# Patient Record
Sex: FEMALE | Race: WHITE | NOT HISPANIC OR LATINO | URBAN - METROPOLITAN AREA
[De-identification: names, ages, dates, MRNs, and addresses within clinical notes are randomized per-mention and may not be internally consistent; named-entity substitution may affect disease eponyms.]

---

## 2021-08-24 ENCOUNTER — EMERGENCY (EMERGENCY)
Facility: HOSPITAL | Age: 46
LOS: 0 days | Discharge: HOME | End: 2021-08-24
Attending: EMERGENCY MEDICINE | Admitting: EMERGENCY MEDICINE
Payer: MEDICAID

## 2021-08-24 VITALS
RESPIRATION RATE: 20 BRPM | OXYGEN SATURATION: 99 % | DIASTOLIC BLOOD PRESSURE: 77 MMHG | HEART RATE: 107 BPM | WEIGHT: 106.92 LBS | SYSTOLIC BLOOD PRESSURE: 121 MMHG | TEMPERATURE: 99 F | HEIGHT: 63 IN

## 2021-08-24 DIAGNOSIS — S91.351A OPEN BITE, RIGHT FOOT, INITIAL ENCOUNTER: ICD-10-CM

## 2021-08-24 DIAGNOSIS — S91.331A PUNCTURE WOUND WITHOUT FOREIGN BODY, RIGHT FOOT, INITIAL ENCOUNTER: ICD-10-CM
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
## 2021-08-24 DIAGNOSIS — Z23 ENCOUNTER FOR IMMUNIZATION: ICD-10-CM

## 2021-08-24 PROCEDURE — 99284 EMERGENCY DEPT VISIT MOD MDM: CPT

## 2021-08-24 RX ORDER — TETANUS TOXOID, REDUCED DIPHTHERIA TOXOID AND ACELLULAR PERTUSSIS VACCINE, ADSORBED 5; 2.5; 8; 8; 2.5 [IU]/.5ML; [IU]/.5ML; UG/.5ML; UG/.5ML; UG/.5ML
0.5 SUSPENSION INTRAMUSCULAR ONCE
Refills: 0 | Status: COMPLETED | OUTPATIENT
Start: 2021-08-24 | End: 2021-08-24

## 2021-08-24 RX ADMIN — TETANUS TOXOID, REDUCED DIPHTHERIA TOXOID AND ACELLULAR PERTUSSIS VACCINE, ADSORBED 0.5 MILLILITER(S): 5; 2.5; 8; 8; 2.5 SUSPENSION INTRAMUSCULAR at 22:35

## 2021-08-24 RX ADMIN — Medication 1 TABLET(S): at 22:34

## 2021-08-24 NOTE — ED PROVIDER NOTE - CLINICAL SUMMARY MEDICAL DECISION MAKING FREE TEXT BOX
46yF pw  rat bite to dorsum of foot occurred today - no indication for rabies PEP.  tetanus  updated abx sent to pharmacy . exam  bite to  foot  no erythema . superficial  . no swelling  NVI   Patient to be discharged from ED in well appearing condition. Any available test results were discussed with and printed  for patient.  Verbal instructions given, including instructions to return to ED immediately for any new, worsening, or concerning symptoms. Limitations of ED work up discussed.  Patient reports understanding of above with capacity and insight. Written discharge instructions additionally given, including follow-up plan.

## 2021-08-24 NOTE — ED PROVIDER NOTE - NS ED ROS FT
Constitutional: (-) fever  Musculoskeletal: (-) neck pain, (-) back pain, (-) joint pain  Integumentary: +abrasion  Neurological: (-) headache, (-) altered mental status

## 2021-08-24 NOTE — ED ADULT NURSE NOTE - NSIMPLEMENTINTERV_GEN_ALL_ED
Implemented All Universal Safety Interventions:  [place] to call system. Call bell, personal items and telephone within reach. Instruct patient to call for assistance. Room bathroom lighting operational. Non-slip footwear when patient is off stretcher. Physically safe environment: no spills, clutter or unnecessary equipment. Stretcher in lowest position, wheels locked, appropriate side rails in place.

## 2021-08-24 NOTE — ED PROVIDER NOTE - PHYSICAL EXAMINATION
Physical Exam    Vital Signs: I have reviewed the initial vital signs.  Constitutional: well-nourished, appears stated age, no acute distress  Musculoskeletal: supple neck, no lower extremity edema, no midline tenderness  Integumentary: warm, dry, no rash + 2 puncture wounds noted to top of foot no swelling or signs of infection no bleeding  Neurologic: awake, alert,  extremities’ motor and sensory functions grossly intact  Psychiatric: appropriate mood, appropriate affect

## 2021-08-24 NOTE — ED PROVIDER NOTE - PATIENT PORTAL LINK FT
You can access the FollowMyHealth Patient Portal offered by [facility] by registering at the following website: [email or web address]. By joining [facility]’s FollowMyHealth portal, you will also be able to view your health information using other applications (apps) compatible with our system.

## 2021-08-24 NOTE — ED PROVIDER NOTE - NSFOLLOWUPINSTRUCTIONS_ED_ALL_ED_FT
Follow up with your primary care doctor in 1-2 days     Animal Bite    Animal bites can range from mild to serious. An animal bite can result in a scratch on the skin, a deep open cut, a puncture of the skin, a crush injury, or tearing away of the skin or a body part. Treatment includes wound care, updating your tetanus shot, and possibly administering a rabies vaccine. If you were prescribed an antibiotic, take or apply it as told by your health care provider. Do not stop using the antibiotic even if your condition improves.      SEEK IMMEDIATE MEDICAL CARE IF YOU HAVE THE FOLLOWING SYMPTOMS: red streaking away from the wound, fluid/blood/pus coming from the wound, fever or chills, trouble moving the injured area, numbness or tingling extending beyond the wound.      Puncture Wound    WHAT YOU NEED TO KNOW:    A puncture wound is a hole in the skin made by a sharp, pointed object.Puncture Wound         DISCHARGE INSTRUCTIONS:    Return to the emergency department if:     You have severe pain.      You have numbness or tingling in the area of your wound.      Your wound starts bleeding and does not stop, even after you apply pressure.    Contact your healthcare provider if:     You have new drainage or a bad odor coming from the wound.      You have a fever.      You have increased swelling, redness, or pain.      You have red streaks on your skin coming from your wound.      You have questions or concerns about your condition or care.    Medicines: You may need any of the following:    NSAIDs, such as ibuprofen, help decrease swelling, pain, and fever. This medicine is available with or without a doctor's order. NSAIDs can cause stomach bleeding or kidney problems in certain people. If you take blood thinner medicine, always ask your healthcare provider if NSAIDs are safe for you. Always read the medicine label and follow directions.      Antibiotics help treat a bacterial infection.       Take your medicine as directed. Contact your healthcare provider if you think your medicine is not helping or if you have side effects. Tell him of her if you are allergic to any medicine. Keep a list of the medicines, vitamins, and herbs you take. Include the amounts, and when and why you take them. Bring the list or the pill bottles to follow-up visits. Carry your medicine list with you in case of an emergency.    Wound care: Keep your wound clean and dry. When you are allowed to bathe, carefully wash the wound with soap and water. Dry the area and put on new, clean bandages as directed. Change your bandages when they get wet or dirty.    Manage your symptoms:     Rest your injured area as much as possible. If the puncture wound is in your leg or foot, use crutches as directed. This will help keep the weight off your injured leg or foot as it heals.       Elevate your injured area above the level of your heart as often as you can. This will help decrease swelling and pain. Prop your injured area on pillows or blankets to keep it elevated comfortably.     Follow up with your healthcare provider in 2 to 3 days: Write down your questions so you remember to ask them during your visits.       © Copyright [employer] 2019 All illustrations and images included in CareNotes are the copyrighted property of [employer] or [employer].

## 2021-08-24 NOTE — ED ADULT NURSE NOTE - NS ED NURSE RECORD ANOTHER VITAL SIGN
Detail Level: Detailed Patient Specific Counseling (Will Not Stick From Patient To Patient): Discussed with mother to wash pod 25% off at the 4 hour jumana due to skin irritation. Yes

## 2021-08-24 NOTE — ED PROVIDER NOTE - OBJECTIVE STATEMENT
46 year old female states this evening she was at the park and was bitten by a rat. patient states that she sore the long tale. patient states she has two small puncture wounds to top of foot. patient went to urgent care center for tetanus shot and was told to come to emergency room.

## 2022-05-11 ENCOUNTER — OUTPATIENT (OUTPATIENT)
Dept: OUTPATIENT SERVICES | Facility: HOSPITAL | Age: 47
LOS: 1 days | Discharge: HOME | End: 2022-05-11
Payer: MEDICAID

## 2022-05-11 DIAGNOSIS — Z12.31 ENCOUNTER FOR SCREENING MAMMOGRAM FOR MALIGNANT NEOPLASM OF BREAST: ICD-10-CM

## 2022-05-11 PROBLEM — Z78.9 OTHER SPECIFIED HEALTH STATUS: Chronic | Status: ACTIVE | Noted: 2021-08-24

## 2022-05-11 PROCEDURE — 77063 BREAST TOMOSYNTHESIS BI: CPT | Mod: 26

## 2022-05-11 PROCEDURE — 77067 SCR MAMMO BI INCL CAD: CPT | Mod: 26

## 2023-08-16 PROBLEM — Z00.00 ENCOUNTER FOR PREVENTIVE HEALTH EXAMINATION: Status: ACTIVE | Noted: 2023-08-16

## 2023-08-18 ENCOUNTER — APPOINTMENT (OUTPATIENT)
Dept: CARDIOLOGY | Facility: CLINIC | Age: 48
End: 2023-08-18
Payer: MEDICAID

## 2023-08-18 VITALS
HEIGHT: 63 IN | WEIGHT: 110 LBS | BODY MASS INDEX: 19.49 KG/M2 | DIASTOLIC BLOOD PRESSURE: 70 MMHG | HEART RATE: 81 BPM | SYSTOLIC BLOOD PRESSURE: 100 MMHG

## 2023-08-18 DIAGNOSIS — R09.89 OTHER SPECIFIED SYMPTOMS AND SIGNS INVOLVING THE CIRCULATORY AND RESPIRATORY SYSTEMS: ICD-10-CM

## 2023-08-18 DIAGNOSIS — Z78.9 OTHER SPECIFIED HEALTH STATUS: ICD-10-CM

## 2023-08-18 PROCEDURE — 99203 OFFICE O/P NEW LOW 30 MIN: CPT

## 2023-08-18 RX ORDER — METHIMAZOLE 5 MG/1
5 TABLET ORAL DAILY
Refills: 0 | Status: ACTIVE | COMMUNITY

## 2023-08-18 NOTE — PHYSICAL EXAM
[General Appearance - Well Developed] : well developed [Normal Conjunctiva] : the conjunctiva exhibited no abnormalities [Normal Jugular Venous V Waves Present] : normal jugular venous V waves present [Heart Sounds] : normal S1 and S2 [] : no respiratory distress [Bowel Sounds] : normal bowel sounds [Abnormal Walk] : normal gait [Nail Clubbing] : no clubbing of the fingernails [Skin Color & Pigmentation] : normal skin color and pigmentation [Oriented To Time, Place, And Person] : oriented to person, place, and time

## 2023-08-19 NOTE — HISTORY OF PRESENT ILLNESS
[FreeTextEntry1] : 47 y/o F with h/o of hyperthyroidism presents today for initial evaluation due to abnormal ECG. Pt supposed to undergo liposuction on august 23rd. Pt is active and denies any CP, SOB or palpitations. In the Gym thre  times weekly for over 2 hrs with no limitations. Normal ECG on todays visit.

## 2023-08-19 NOTE — ASSESSMENT
PT Evaluation     Today's date: 2022  Patient name: Rupal Louise  : 1985  MRN: [id number]  Referring provider: JOSE Jean-Baptiste  Dx:   Encounter Diagnosis     ICD-10-CM    1  Chronic bilateral low back pain without sciatica  M54 50 Ambulatory Referral to Physical Therapy    G89 29                   Assessment  Assessment details: José Gant is a 39year old female presenting to physical therapy with low back pain  Patient presents with pain, decreased strength, decreased range of motion, impaired joint mobility and decreased tolerance to activity  She presents with difficulty achieving core muscle contractions and seems to have pain with dynamic activities including lifting, she would benefit from formal core strengthening and soft tissue mobilizations  Due to these impairments patient has difficulty performing activities of daily living, and activities involving her work as a [employer]  Patient would benefit from skilled physical therapy services to address these impairments and to maximize function  Thank you for the referral      Impairments: abnormal muscle firing, abnormal or restricted ROM, activity intolerance, impaired physical strength, lacks appropriate home exercise program and pain with function  Understanding of Dx/Px/POC: excellent  Goals  Impairment Goals:  1 ) Pt will have decreased sx at worst by 50% in 2-4 weeks  2 ) Pt will have improve active lumbar range of motion to WNL without pain in 3-4 weeks  3 ) Pt will have improved hip strength throughout by 1/2 MMT in 4-6 weeks  4 ) Pt will have decreased tenderness to palpation to lumbar paraspinal musculature by 50% in 4-6 weeks  Functional Goals:  1 ) Pt will be independent in their home exercise program in 1 week  2 ) Pt will be able to roll in bed without pain in 4-6 weeks  3 ) Pt will be able to complete all ADL's without pain in 6-8 weeks  4 ) Pt will have an improved FOTO score of 75/100 in 6-8 weeks    5 ) Pt will be able [FreeTextEntry1] : 47 y/o F with h/o of hyperthyroidism presents today for initial evaluation due to abnormal ECG. Pt supposed to undergo liposuction on august 23rd.  Pt is active and denies any CP, SOB or palpitations. In the Gym three times weekly for over 2 hrs  with no limitations.        For risk stratification prior to liposuction. IE. low risk surgery.    LDL 77 A1C 5.0   Plan: - No further cardic. w/u needed prior to liposuction. - Continue risk factor modifications.  - Elective LUBA/PVR  to  a one year old without back pain in 6-8 weeks  6 ) Pt will be able to traverse stairs while carrying a 10 lb object without difficulty or pain in 6-8 weeks  Plan  Patient would benefit from: skilled PT  Planned therapy interventions: joint mobilization, manual therapy, patient education, postural training, strengthening, stretching, work reintegration, home exercise program, therapeutic exercise, body mechanics training, neuromuscular re-education, graded exercise, graded activity, activity modification and abdominal trunk stabilization  Frequency: 2x week  Duration in weeks: 8  Plan of Care beginning date: 2022  Plan of Care expiration date: 2022  Treatment plan discussed with: patient        Subjective Evaluation    History of Present Illness  Mechanism of injury: Roselia Zazueta is a 39year old female presenting to physical therapy with ongoing low back pain  Her pain started about 2 years ago, she recalls a mechanism last year she fell and landed on her back after slipping on ice  After this injury she notes that she limped for about 2 months and it has not felt the same since  Her pain resides mostly in the lower lumbar spine noted as tight and achy  At times she explains pain going down into the right thigh and rarely into the foot, this hasn't happened in over a month  She notes that her back is stiff first thing in the morning  Things such as lifting up a one year old, she is a full time nanny, lifting a box, and rolling in bed are difficult  She denies unrelenting night pain, worsening pain, weakness, numbness and tingling or bowel and bladder changes      Eases: movement, rest  Aggs: walking > 15 minutes, rolling in bed, lifting up one year old, stairs  Pain  Current pain ratin  At best pain ratin  At worst pain ratin  Quality: dull ache, tight and radiating  Relieving factors: rest and change in position  Aggravating factors: stair climbing, walking, standing and lifting  Progression: no change    Patient Goals  Patient goals for therapy: decreased pain, increased motion and return to sport/leisure activities          Objective     Concurrent Complaints  Negative for night pain, disturbed sleep, bladder dysfunction, bowel dysfunction and saddle (S4) numbness    Palpation   Left   Tenderness of the erector spinae, lumbar paraspinals and quadratus lumborum  Right   Tenderness of the erector spinae, lumbar paraspinals and quadratus lumborum  Neurological Testing     Sensation     Lumbar   Left   Intact: light touch    Right   Intact: light touch    Active Range of Motion     Lumbar   Flexion:  WFL  Extension:  with pain Restriction level: moderate  Left lateral flexion:  with pain Restriction level: moderate  Right lateral flexion:  with pain Restriction level: moderate  Left rotation:  WFL  Right rotation:  [facility]    Joint Play     Pain: L1, L2, L3, L4 and L5     Strength/Myotome Testing     Left Hip   Planes of Motion   Flexion: 4+  Extension: 4  Abduction: 4    Right Hip   Planes of Motion   Flexion: 4+  Extension: 4  Abduction: 4    Left Knee   Flexion: 4+  Extension: 4+    Right Knee   Flexion: 4+  Extension: 4+    Left Ankle/Foot   Dorsiflexion: 4+  Plantar flexion: 4+    Right Ankle/Foot   Dorsiflexion: 4+  Plantar flexion: 4+    Muscle Activation   Patient able to activate left transverse abdominals, left multifidus, right transverse abdominals and right multifidus  Additional Muscle Activation Details  During assessment of core activation, heavy cueing required to active above musculature and achieve a posterior pelvic tilt position  No pain during completion of activation       Tests     Lumbar     Left   Negative crossed SLR, passive SLR, quadrant and slump test      Right   Negative crossed SLR, passive SLR, quadrant and slump test      Left Pelvic Girdle/Sacrum   Negative: thigh thrust      Right Pelvic Girdle/Sacrum   Negative: thigh thrust      Left Hip Negative GENE  Right Hip   Negative GENE         Flowsheet Rows      Most Recent Value   PT/OT G-Codes    Current Score 58   Projected Score 72             Precautions: HTN      Manuals 4/27            STM L PSM nv                                                   Neuro Re-Ed             PPT 10x10''            PPT march nv            BKFO nv            Palloff press                                                    Ther Ex             Bridges 2x10            SKTC 10x10'' ea            Supine piri s' 10x10'' ea            SL clamshells 2x10 GTB                                                                Ther Activity             Box lifting                          Gait Training                                       Modalities

## 2023-09-06 ENCOUNTER — APPOINTMENT (OUTPATIENT)
Dept: CARDIOLOGY | Facility: CLINIC | Age: 48
End: 2023-09-06
Payer: MEDICAID

## 2023-09-06 PROCEDURE — 93923 UPR/LXTR ART STDY 3+ LVLS: CPT

## 2024-08-06 ENCOUNTER — OUTPATIENT (OUTPATIENT)
Dept: OUTPATIENT SERVICES | Facility: HOSPITAL | Age: 49
LOS: 1 days | End: 2024-08-06
Payer: COMMERCIAL

## 2024-08-06 DIAGNOSIS — Z12.31 ENCOUNTER FOR SCREENING MAMMOGRAM FOR MALIGNANT NEOPLASM OF BREAST: ICD-10-CM

## 2024-08-06 PROCEDURE — 77063 BREAST TOMOSYNTHESIS BI: CPT

## 2024-08-06 PROCEDURE — 77063 BREAST TOMOSYNTHESIS BI: CPT | Mod: 26

## 2024-08-06 PROCEDURE — 77067 SCR MAMMO BI INCL CAD: CPT | Mod: 26

## 2024-08-06 PROCEDURE — 77067 SCR MAMMO BI INCL CAD: CPT

## 2024-08-07 DIAGNOSIS — Z12.31 ENCOUNTER FOR SCREENING MAMMOGRAM FOR MALIGNANT NEOPLASM OF BREAST: ICD-10-CM
